# Patient Record
Sex: FEMALE | Race: WHITE | Employment: FULL TIME | URBAN - NONMETROPOLITAN AREA
[De-identification: names, ages, dates, MRNs, and addresses within clinical notes are randomized per-mention and may not be internally consistent; named-entity substitution may affect disease eponyms.]

---

## 2021-03-02 RX ORDER — NORGESTREL-ETHINYL ESTRADIOL 0.3-0.03MG
1 TABLET ORAL DAILY
Qty: 3 PACKET | Refills: 0 | Status: SHIPPED | OUTPATIENT
Start: 2021-03-02 | End: 2021-03-29 | Stop reason: SDUPTHER

## 2021-03-02 NOTE — TELEPHONE ENCOUNTER
Received an electronic Digi request that patient is needing refills of her Cryselle. She has an annual appt scheduled with you on 3/29. Ok to refill until that time?

## 2021-03-29 ENCOUNTER — OFFICE VISIT (OUTPATIENT)
Dept: OBGYN CLINIC | Age: 41
End: 2021-03-29
Payer: COMMERCIAL

## 2021-03-29 VITALS
WEIGHT: 180.6 LBS | SYSTOLIC BLOOD PRESSURE: 117 MMHG | DIASTOLIC BLOOD PRESSURE: 71 MMHG | HEIGHT: 68 IN | BODY MASS INDEX: 27.37 KG/M2

## 2021-03-29 DIAGNOSIS — N94.6 DYSMENORRHEA: ICD-10-CM

## 2021-03-29 DIAGNOSIS — Z01.419 WOMEN'S ANNUAL ROUTINE GYNECOLOGICAL EXAMINATION: Primary | ICD-10-CM

## 2021-03-29 PROCEDURE — 99395 PREV VISIT EST AGE 18-39: CPT | Performed by: NURSE PRACTITIONER

## 2021-03-29 RX ORDER — NORGESTREL-ETHINYL ESTRADIOL 0.3-0.03MG
1 TABLET ORAL DAILY
Qty: 3 PACKET | Refills: 4 | Status: SHIPPED | OUTPATIENT
Start: 2021-03-29 | End: 2022-04-05

## 2021-03-29 RX ORDER — LEVOTHYROXINE SODIUM 0.12 MG/1
TABLET ORAL
COMMUNITY
Start: 2021-03-01

## 2021-03-29 NOTE — PROGRESS NOTES
YEARLY PHYSICAL    Date of service: 3/29/2021    Josefina Thorne  Is a 45 y.o. female    PT's PCP is: No primary care provider on file. : 1980                                             Subjective:       Patient's last menstrual period was 03/10/2021.      Are your menses regular: yes    OB History    Para Term  AB Living   2 2 2     2   SAB TAB Ectopic Molar Multiple Live Births             2      # Outcome Date GA Lbr Jeramie/2nd Weight Sex Delivery Anes PTL Lv   2 Term     M Vag-Spont   FLORESITA   1 Term     M Vag-Spont   FLORESITA        Social History     Tobacco Use   Smoking Status Never Smoker   Smokeless Tobacco Never Used        Social History     Substance and Sexual Activity   Alcohol Use Not Currently       Family History   Problem Relation Age of Onset    Heart Attack Paternal Grandfather     Hypertension Paternal Grandmother     High Cholesterol Paternal Grandmother     Hypertension Maternal Grandmother     Rectal Cancer Maternal Grandmother     Diabetes Maternal Grandfather     Heart Attack Maternal Grandfather     Hypertension Maternal Grandfather     Cancer Father         skin    Diabetes Father     Hypertension Father     High Cholesterol Father        Any family history of breast or ovarian cancer: No    Any family history of blood clots: No      Allergies: No known allergies      Current Outpatient Medications:     levothyroxine (SYNTHROID) 125 MCG tablet, TAKE ONE TABLET BY MOUTH ON EMPTY stomach, no other meds, foods OR drink FOR 30 minutes, Disp: , Rfl:     norgestrel-ethinyl estradiol (CRYSELLE-28) 0.3-30 MG-MCG per tablet, Take 1 tablet by mouth daily, Disp: 3 packet, Rfl: 4    Social History     Substance and Sexual Activity   Sexual Activity Yes    Partners: Male    Birth control/protection: OCP       Any bleeding or pain with intercourse: No    Last Yearly:  3/2020    Last pap: 3/2020, negative    Last HPV: 3/2020, negative     Last Mammogram: n/a    Do you do self breast exams: encouraged monthly SBE     Past Medical History:   Diagnosis Date    H/O headache     Thyroid disease        History reviewed. No pertinent surgical history. Family History   Problem Relation Age of Onset    Heart Attack Paternal Grandfather     Hypertension Paternal Grandmother     High Cholesterol Paternal Grandmother     Hypertension Maternal Grandmother     Rectal Cancer Maternal Grandmother     Diabetes Maternal Grandfather     Heart Attack Maternal Grandfather     Hypertension Maternal Grandfather     Cancer Father         skin    Diabetes Father     Hypertension Father     High Cholesterol Father        Chief Complaint   Patient presents with    Gynecologic Exam     Pap not due. Feeling well, voices no concerns. Regular menses with OCP. PE:  Vital Signs  Blood pressure 117/71, height 5' 8\" (1.727 m), weight 180 lb 9.6 oz (81.9 kg), last menstrual period 03/10/2021. Estimated body mass index is 27.46 kg/m² as calculated from the following:    Height as of this encounter: 5' 8\" (1.727 m). Weight as of this encounter: 180 lb 9.6 oz (81.9 kg). HPI: Patient presents today for annual exam. Feeling well, voices no concerns. Denies breast/pelvic pain. Reports monthly menses. Negative pap 3/2020. Wellness UTD. Review of Systems   All other systems reviewed and are negative. Objective  Heent:   negative   Cor: regular rate and rhythm, no murmurs              Pul:clear to auscultation bilaterally- no wheezes, rales or rhonchi, normal air movement, no respiratory distress      GI: Abdomen soft, non-tender.  BS normal. No masses,  No organomegaly           Extremities: normal strength, tone, and muscle mass, ROM of all joints is normal   Breasts: Breast:normal appearance, no masses or tenderness, No nipple retraction or dimpling, No nipple discharge or bleeding   Pelvic Exam:

## 2022-04-04 ENCOUNTER — OFFICE VISIT (OUTPATIENT)
Dept: OBGYN CLINIC | Age: 42
End: 2022-04-04
Payer: COMMERCIAL

## 2022-04-04 VITALS
WEIGHT: 184.4 LBS | HEIGHT: 68 IN | DIASTOLIC BLOOD PRESSURE: 74 MMHG | BODY MASS INDEX: 27.95 KG/M2 | SYSTOLIC BLOOD PRESSURE: 121 MMHG

## 2022-04-04 DIAGNOSIS — N94.6 DYSMENORRHEA: ICD-10-CM

## 2022-04-04 DIAGNOSIS — Z01.419 WOMEN'S ANNUAL ROUTINE GYNECOLOGICAL EXAMINATION: Primary | ICD-10-CM

## 2022-04-04 PROCEDURE — 99396 PREV VISIT EST AGE 40-64: CPT | Performed by: NURSE PRACTITIONER

## 2022-04-04 RX ORDER — ACETAMINOPHEN AND CODEINE PHOSPHATE 120; 12 MG/5ML; MG/5ML
1 SOLUTION ORAL DAILY
Qty: 28 TABLET | Refills: 12 | Status: SHIPPED | OUTPATIENT
Start: 2022-04-04

## 2022-04-04 ASSESSMENT — ENCOUNTER SYMPTOMS
CONSTIPATION: 0
SHORTNESS OF BREATH: 0
DIARRHEA: 0
ABDOMINAL PAIN: 0

## 2022-04-04 NOTE — PROGRESS NOTES
YEARLY PHYSICAL    Date of service: 2022    Germania Sheehan  Is a 37 y.o.  female    PT's PCP is: No primary care provider on file. : 1980                                         Chaperone for Intimate Exam   Chaperone was offered as part of the rooming process. Patient declined and agrees to continue with exam without a chaperone.  Chaperone: n/a      Subjective:       Patient's last menstrual period was 2022.      Are your menses regular: yes    OB History    Para Term  AB Living   2 2 2     2   SAB IAB Ectopic Molar Multiple Live Births             2      # Outcome Date GA Lbr Jeramie/2nd Weight Sex Delivery Anes PTL Lv   2 Term     M Vag-Spont   FLORESITA   1 Term     M Vag-Spont   FLORESITA        Social History     Tobacco Use   Smoking Status Never Smoker   Smokeless Tobacco Never Used        Social History     Substance and Sexual Activity   Alcohol Use Not Currently       Family History   Problem Relation Age of Onset    Heart Attack Paternal Grandfather     Hypertension Paternal Grandmother     High Cholesterol Paternal Grandmother     Hypertension Maternal Grandmother     Rectal Cancer Maternal Grandmother     Diabetes Maternal Grandfather     Heart Attack Maternal Grandfather     Hypertension Maternal Grandfather     Cancer Father         skin    Diabetes Father     Hypertension Father     High Cholesterol Father     Breast Cancer Sister 43       Any family history of breast or ovarian cancer: Yes, first degree relative     Any family history of blood clots: No      Allergies: No known allergies      Current Outpatient Medications:     norethindrone (ORTHO MICRONOR) 0.35 MG tablet, Take 1 tablet by mouth daily, Disp: 28 tablet, Rfl: 12    levothyroxine (SYNTHROID) 125 MCG tablet, TAKE ONE TABLET BY MOUTH ON EMPTY stomach, no other meds, foods OR drink FOR 30 minutes, Disp: , Rfl:    norgestrel-ethinyl estradiol (CRYSELLE-28) 0.3-30 MG-MCG per tablet, Take 1 tablet by mouth daily, Disp: 3 packet, Rfl: 4    Social History     Substance and Sexual Activity   Sexual Activity Yes    Partners: Male    Birth control/protection: OCP       Any bleeding or pain with intercourse: No    Last Yearly:  3/2021    Last pap: 3/2020, negative     Last HPV: 3/2020, negative     Last Mammogram: n/a    Do you do self breast exams: encouraged     Past Medical History:   Diagnosis Date    H/O headache     Thyroid disease        History reviewed. No pertinent surgical history. Family History   Problem Relation Age of Onset    Heart Attack Paternal Grandfather     Hypertension Paternal Grandmother     High Cholesterol Paternal Grandmother     Hypertension Maternal Grandmother     Rectal Cancer Maternal Grandmother     Diabetes Maternal Grandfather     Heart Attack Maternal Grandfather     Hypertension Maternal Grandfather     Cancer Father         skin    Diabetes Father     Hypertension Father     High Cholesterol Father     Breast Cancer Sister 43       Chief Complaint   Patient presents with    Gynecologic Exam     Last pap 3/26/20. Feeling well, voices no concerns. PE:  Vital Signs  Blood pressure 121/74, height 5' 8\" (1.727 m), weight 184 lb 6.4 oz (83.6 kg), last menstrual period 03/09/2022. Estimated body mass index is 28.04 kg/m² as calculated from the following:    Height as of this encounter: 5' 8\" (1.727 m). Weight as of this encounter: 184 lb 6.4 oz (83.6 kg). HPI: Patient presents today for annual exam. Feeling well, voices no concerns. Denies breast/pelvic pain. Negative pap 3/2020. Mammogram due. Wellness reviewed. Reports monthly menses. 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       Review of Systems   Constitutional: Negative for chills, fatigue and fever. Respiratory: Negative for shortness of breath.     Cardiovascular: Negative for chest pain.   Gastrointestinal: Negative for abdominal pain, constipation and diarrhea. Genitourinary: Negative for dysuria, enuresis, frequency, menstrual problem, pelvic pain, urgency and vaginal bleeding. Neurological: Negative for dizziness, light-headedness and headaches. Physical Exam  Constitutional:       General: She is not in acute distress. Appearance: Normal appearance. She is not ill-appearing. Genitourinary:      Vulva normal.      No vaginal discharge. Right Adnexa: not tender. Left Adnexa: not tender. Uterus is not tender. Breasts:      Right: No mass, nipple discharge, skin change or tenderness. Left: No mass, nipple discharge, skin change or tenderness. HENT:      Head: Normocephalic. Cardiovascular:      Rate and Rhythm: Normal rate and regular rhythm. Pulmonary:      Effort: Pulmonary effort is normal.      Breath sounds: Normal breath sounds. Abdominal:      Palpations: Abdomen is soft. Tenderness: There is no abdominal tenderness. There is no guarding or rebound. Musculoskeletal:         General: Normal range of motion. Neurological:      General: No focal deficit present. Mental Status: She is alert. Psychiatric:         Mood and Affect: Mood normal.         Behavior: Behavior normal.                             Assessment and Plan          Diagnosis Orders   1. Women's annual routine gynecological examination     2. Dysmenorrhea  norethindrone (ORTHO MICRONOR) 0.35 MG tablet       Repeat Annual every 1 year  Cervical Cytology Evaluation begins at 25years old. If Negative Cytology, Follow-up screening per current guidelines. Mammograms every 1year. If 38 yo and last mammogram was negative. Routine healthmaintenance per patients PCP. sister with breast cancer diagnosed this past year. Will begin screening mammograms. Plans to [facility] at [facility] [place]. Will trial POP secondary to family hx of cardiovascular disease and age. I am having Evelia Dinh start on norethindrone. I am also having her maintain her levothyroxine and Cryselle-28. Return in about 1 year (around 4/4/2023) for yearly. She was also counseled on her preventative health maintenance recommendations and follow-up. There are no Patient Instructions on file for this visit.     JINA Swann NP,4/4/2022 11:44 AM

## 2022-04-05 DIAGNOSIS — N94.6 DYSMENORRHEA: ICD-10-CM

## 2022-04-05 RX ORDER — NORGESTREL-ETHINYL ESTRADIOL 0.3-0.03MG
TABLET ORAL
Qty: 84 TABLET | Refills: 4 | Status: SHIPPED | OUTPATIENT
Start: 2022-04-05

## 2024-04-15 ENCOUNTER — OFFICE VISIT (OUTPATIENT)
Dept: OBGYN CLINIC | Age: 44
End: 2024-04-15
Payer: COMMERCIAL

## 2024-04-15 VITALS
BODY MASS INDEX: 29.1 KG/M2 | HEIGHT: 68 IN | DIASTOLIC BLOOD PRESSURE: 62 MMHG | SYSTOLIC BLOOD PRESSURE: 98 MMHG | WEIGHT: 192 LBS

## 2024-04-15 DIAGNOSIS — Z01.419 WOMEN'S ANNUAL ROUTINE GYNECOLOGICAL EXAMINATION: Primary | ICD-10-CM

## 2024-04-15 PROCEDURE — 99396 PREV VISIT EST AGE 40-64: CPT | Performed by: NURSE PRACTITIONER

## 2024-04-15 ASSESSMENT — ENCOUNTER SYMPTOMS
CONSTIPATION: 0
ABDOMINAL PAIN: 0
DIARRHEA: 0
SHORTNESS OF BREATH: 0

## 2024-04-15 NOTE — PROGRESS NOTES
Cardiovascular:  Negative for chest pain.   Gastrointestinal:  Negative for abdominal pain, constipation and diarrhea.   Genitourinary:  Negative for dyspareunia, dysuria, enuresis, frequency, menstrual problem, pelvic pain, urgency, vaginal bleeding, vaginal discharge and vaginal pain.   Neurological:  Negative for dizziness, light-headedness and headaches.       Physical Exam  Constitutional:       General: She is not in acute distress.     Appearance: Normal appearance. She is not ill-appearing.   Genitourinary:      Vulva, bladder and urethral meatus normal.      No lesions in the vagina.      Right Labia: No rash or lesions.     Left Labia: No lesions or rash.     No vaginal discharge.      No vaginal prolapse present.     No vaginal atrophy present.       Right Adnexa: not tender and no mass present.     Left Adnexa: not tender and no mass present.     No cervical motion tenderness, discharge or friability.      No parametrium nodularity present.     Uterus is not enlarged or tender.      No uterine mass detected.     No urethral prolapse, tenderness or mass present.   Breasts:     Right: No mass, nipple discharge, skin change or tenderness.      Left: No mass, nipple discharge, skin change or tenderness.   HENT:      Head: Normocephalic and atraumatic.   Eyes:      Extraocular Movements: Extraocular movements intact.      Pupils: Pupils are equal, round, and reactive to light.   Cardiovascular:      Rate and Rhythm: Normal rate.   Pulmonary:      Effort: Pulmonary effort is normal.   Abdominal:      Palpations: Abdomen is soft.      Tenderness: There is no abdominal tenderness. There is no guarding or rebound.   Musculoskeletal:         General: Normal range of motion.   Neurological:      General: No focal deficit present.      Mental Status: She is alert and oriented to person, place, and time.   Skin:     General: Skin is warm and dry.   Psychiatric:         Mood and Affect: Mood normal.

## 2024-11-19 ENCOUNTER — OFFICE VISIT (OUTPATIENT)
Dept: FAMILY MEDICINE CLINIC | Age: 44
End: 2024-11-19
Payer: COMMERCIAL

## 2024-11-19 VITALS
DIASTOLIC BLOOD PRESSURE: 65 MMHG | WEIGHT: 190 LBS | RESPIRATION RATE: 16 BRPM | BODY MASS INDEX: 28.89 KG/M2 | SYSTOLIC BLOOD PRESSURE: 99 MMHG | TEMPERATURE: 98.1 F | HEART RATE: 78 BPM | OXYGEN SATURATION: 98 %

## 2024-11-19 DIAGNOSIS — J20.8 VIRAL BRONCHITIS: Primary | ICD-10-CM

## 2024-11-19 PROCEDURE — 99203 OFFICE O/P NEW LOW 30 MIN: CPT | Performed by: STUDENT IN AN ORGANIZED HEALTH CARE EDUCATION/TRAINING PROGRAM

## 2024-11-19 RX ORDER — BENZONATATE 200 MG/1
200 CAPSULE ORAL 3 TIMES DAILY PRN
Qty: 21 CAPSULE | Refills: 0 | Status: SHIPPED | OUTPATIENT
Start: 2024-11-19 | End: 2024-11-26

## 2024-11-19 RX ORDER — GUAIFENESIN 600 MG/1
1200 TABLET, EXTENDED RELEASE ORAL 2 TIMES DAILY
Qty: 28 TABLET | Refills: 0 | Status: SHIPPED | OUTPATIENT
Start: 2024-11-19 | End: 2024-11-26

## 2024-11-19 RX ORDER — PREDNISONE 20 MG/1
TABLET ORAL
Qty: 9 TABLET | Refills: 0 | Status: SHIPPED | OUTPATIENT
Start: 2024-11-19 | End: 2024-11-25

## 2024-11-19 SDOH — ECONOMIC STABILITY: FOOD INSECURITY: WITHIN THE PAST 12 MONTHS, YOU WORRIED THAT YOUR FOOD WOULD RUN OUT BEFORE YOU GOT MONEY TO BUY MORE.: PATIENT DECLINED

## 2024-11-19 SDOH — ECONOMIC STABILITY: FOOD INSECURITY: WITHIN THE PAST 12 MONTHS, THE FOOD YOU BOUGHT JUST DIDN'T LAST AND YOU DIDN'T HAVE MONEY TO GET MORE.: PATIENT DECLINED

## 2024-11-19 SDOH — ECONOMIC STABILITY: INCOME INSECURITY: HOW HARD IS IT FOR YOU TO PAY FOR THE VERY BASICS LIKE FOOD, HOUSING, MEDICAL CARE, AND HEATING?: PATIENT DECLINED

## 2024-11-19 ASSESSMENT — ENCOUNTER SYMPTOMS
COUGH: 1
NAUSEA: 0
ABDOMINAL PAIN: 0
RHINORRHEA: 0
CONSTIPATION: 0
DIARRHEA: 0
CHEST TIGHTNESS: 1
SHORTNESS OF BREATH: 0
VOMITING: 0

## 2024-11-19 ASSESSMENT — PATIENT HEALTH QUESTIONNAIRE - PHQ9: DEPRESSION UNABLE TO ASSESS: PT REFUSES

## 2024-11-19 NOTE — PROGRESS NOTES
Rylee Bailon (:  1980) is a 44 y.o. female,Established patient, here for evaluation of the following chief complaint(s):  Fever (X 1 week ) and Chest Congestion (X 1 week )      Assessment & Plan   ASSESSMENT/PLAN:  1. Viral bronchitis  -     benzonatate (TESSALON) 200 MG capsule; Take 1 capsule by mouth 3 times daily as needed for Cough, Disp-21 capsule, R-0Normal  -     predniSONE (DELTASONE) 20 MG tablet; Take 2 tablets by mouth daily for 3 days, THEN 1 tablet daily for 3 days., Disp-9 tablet, R-0Normal  -     guaiFENesin (MUCINEX) 600 MG extended release tablet; Take 2 tablets by mouth 2 times daily for 7 days, Disp-28 tablet, R-0Normal  44-year-old female presents the office for a 1 week history of fever, chest congestion, cough.  Etiology patient symptoms consist of viral bronchitis.  Will treat with combination as above.  Follow-up with symptoms worsen, do not prove.  Patient verbalized understanding    Return if symptoms worsen or fail to improve.         Subjective   SUBJECTIVE/OBJECTIVE:  44-year-old female presents the office for a 1 week history of cough, congestion, fever, chest tightness.  Patient states she is been using DayQuil, NyQuil without success.  Most of her nasal congestion is gone but she still a lot of chest congestion, cough is much when she is lying down at night.  Has had fevers anywhere from 99 °F up to 101 °F at home.  Using Tylenol/Motrin.  Patient aware what she can do for her cough and chest congestion.      Past Medical History:   Diagnosis Date    H/O headache     Hypothyroidism     Thyroid disease        History reviewed. No pertinent surgical history.    Family History   Problem Relation Age of Onset    Heart Attack Paternal Grandfather     Diabetes Paternal Grandfather     Hypertension Paternal Grandmother     High Cholesterol Paternal Grandmother     Deep Vein Thrombosis Maternal Grandmother     Hypertension Maternal Grandmother     Rectal Cancer Maternal

## 2025-04-28 ENCOUNTER — TELEPHONE (OUTPATIENT)
Dept: OBGYN CLINIC | Age: 45
End: 2025-04-28

## 2025-04-28 NOTE — TELEPHONE ENCOUNTER
Patient informed of results and recommendations. She is aware that she can consider screening breast MRI to be done in 6 months alternating with mamms.  Advised her to check with insurance to see if screening MRI is covered and we can pre-cert.  She will call back if she desires an order for this.

## 2025-04-28 NOTE — TELEPHONE ENCOUNTER
Please call patient with mammogram results    Negative for malignancy but lifetime risk is >20% (hers is 28.5%) therefore candidate for screening breast MRI - if she would like to proceed we will need to PA this.    Otherwise annual mammogram

## 2025-06-19 ENCOUNTER — OFFICE VISIT (OUTPATIENT)
Dept: OBGYN CLINIC | Age: 45
End: 2025-06-19
Payer: COMMERCIAL

## 2025-06-19 VITALS
SYSTOLIC BLOOD PRESSURE: 122 MMHG | DIASTOLIC BLOOD PRESSURE: 78 MMHG | BODY MASS INDEX: 29.25 KG/M2 | WEIGHT: 193 LBS | HEIGHT: 68 IN

## 2025-06-19 DIAGNOSIS — Z01.419 WOMEN'S ANNUAL ROUTINE GYNECOLOGICAL EXAMINATION: Primary | ICD-10-CM

## 2025-06-19 DIAGNOSIS — N92.6 MENSTRUAL CHANGES: ICD-10-CM

## 2025-06-19 PROCEDURE — 99396 PREV VISIT EST AGE 40-64: CPT | Performed by: NURSE PRACTITIONER

## 2025-06-19 ASSESSMENT — ENCOUNTER SYMPTOMS
CONSTIPATION: 0
SHORTNESS OF BREATH: 0
ABDOMINAL PAIN: 0
DIARRHEA: 0

## 2025-06-19 NOTE — PROGRESS NOTES
Substance and Sexual Activity   Sexual Activity Yes    Partners: Male    Comment:        Any bleeding or pain with intercourse: No    Last Yearly:  4/15/24    Last pap: 4/10/23-neg    Last HPV: 4/10/23-neg    Last Mammogram: 4/24/25  Increased lifetime risk of breast cancer, does not want MRI at this time    Last Dexascan n/a    Last colorectal screen- n/a    Do you do self breast exams: encouraged    Past Medical History:   Diagnosis Date    H/O headache     Hypothyroidism     Thyroid disease        History reviewed. No pertinent surgical history.    Family History   Problem Relation Age of Onset    Heart Attack Paternal Grandfather     Diabetes Paternal Grandfather     Hypertension Paternal Grandmother     High Cholesterol Paternal Grandmother     Deep Vein Thrombosis Maternal Grandmother     Hypertension Maternal Grandmother     Rectal Cancer Maternal Grandmother     Cancer Maternal Grandmother         Rectal Cancer    Diabetes Maternal Grandfather     Heart Attack Maternal Grandfather     Hypertension Maternal Grandfather     Cancer Father         skin    Diabetes Father     Hypertension Father     High Cholesterol Father     Breast Cancer Sister 42       Chief Complaint   Patient presents with    Annual Exam     Last pap 4/10/2023. Reports irregular cycles x 3 mos.           PE:  Vital Signs  Blood pressure 122/78, height 1.727 m (5' 8\"), weight 87.5 kg (193 lb), last menstrual period 06/01/2025.  Estimated body mass index is 29.35 kg/m² as calculated from the following:    Height as of this encounter: 1.727 m (5' 8\").    Weight as of this encounter: 87.5 kg (193 lb).    HPI: Patient presents today for annual exam. Feeling well, voices no concerns. Denies breast/pelvic pain. Negative pap 4/2023. Mammogram UTD. Wellness reviewed. Reports monthly menses; last 3 months have been slightly heavier than typically.       Review of Systems   Constitutional:  Negative for chills, fatigue and fever.

## 2025-08-20 SDOH — HEALTH STABILITY: PHYSICAL HEALTH: ON AVERAGE, HOW MANY MINUTES DO YOU ENGAGE IN EXERCISE AT THIS LEVEL?: 60 MIN

## 2025-08-20 SDOH — HEALTH STABILITY: PHYSICAL HEALTH: ON AVERAGE, HOW MANY DAYS PER WEEK DO YOU ENGAGE IN MODERATE TO STRENUOUS EXERCISE (LIKE A BRISK WALK)?: 1 DAY

## 2025-08-21 ENCOUNTER — OFFICE VISIT (OUTPATIENT)
Dept: FAMILY MEDICINE CLINIC | Age: 45
End: 2025-08-21
Payer: COMMERCIAL

## 2025-08-21 VITALS
HEART RATE: 71 BPM | OXYGEN SATURATION: 99 % | WEIGHT: 193.8 LBS | TEMPERATURE: 97.7 F | BODY MASS INDEX: 29.47 KG/M2 | DIASTOLIC BLOOD PRESSURE: 68 MMHG | SYSTOLIC BLOOD PRESSURE: 118 MMHG

## 2025-08-21 DIAGNOSIS — E03.8 OTHER SPECIFIED HYPOTHYROIDISM: ICD-10-CM

## 2025-08-21 DIAGNOSIS — Z11.59 ENCOUNTER FOR HCV SCREENING TEST FOR LOW RISK PATIENT: ICD-10-CM

## 2025-08-21 DIAGNOSIS — E66.3 OVERWEIGHT (BMI 25.0-29.9): ICD-10-CM

## 2025-08-21 DIAGNOSIS — Z11.4 SCREENING FOR HUMAN IMMUNODEFICIENCY VIRUS: ICD-10-CM

## 2025-08-21 DIAGNOSIS — Z13.1 DIABETES MELLITUS SCREENING: ICD-10-CM

## 2025-08-21 DIAGNOSIS — R04.2 HEMOPTYSIS: Primary | ICD-10-CM

## 2025-08-21 DIAGNOSIS — Z13.220 LIPID SCREENING: ICD-10-CM

## 2025-08-21 PROCEDURE — 99214 OFFICE O/P EST MOD 30 MIN: CPT | Performed by: STUDENT IN AN ORGANIZED HEALTH CARE EDUCATION/TRAINING PROGRAM

## 2025-08-21 SDOH — ECONOMIC STABILITY: FOOD INSECURITY: WITHIN THE PAST 12 MONTHS, THE FOOD YOU BOUGHT JUST DIDN'T LAST AND YOU DIDN'T HAVE MONEY TO GET MORE.: NEVER TRUE

## 2025-08-21 SDOH — ECONOMIC STABILITY: FOOD INSECURITY: WITHIN THE PAST 12 MONTHS, YOU WORRIED THAT YOUR FOOD WOULD RUN OUT BEFORE YOU GOT MONEY TO BUY MORE.: NEVER TRUE

## 2025-08-21 ASSESSMENT — PATIENT HEALTH QUESTIONNAIRE - PHQ9
SUM OF ALL RESPONSES TO PHQ QUESTIONS 1-9: 0
2. FEELING DOWN, DEPRESSED OR HOPELESS: NOT AT ALL
SUM OF ALL RESPONSES TO PHQ QUESTIONS 1-9: 0
SUM OF ALL RESPONSES TO PHQ QUESTIONS 1-9: 0
1. LITTLE INTEREST OR PLEASURE IN DOING THINGS: NOT AT ALL
SUM OF ALL RESPONSES TO PHQ QUESTIONS 1-9: 0

## 2025-08-21 ASSESSMENT — ENCOUNTER SYMPTOMS
CONSTIPATION: 0
VOMITING: 0
NAUSEA: 0
DIARRHEA: 0
ABDOMINAL PAIN: 0
SHORTNESS OF BREATH: 0
COUGH: 0

## 2025-08-25 DIAGNOSIS — R04.2 HEMOPTYSIS: Primary | ICD-10-CM

## 2025-08-26 ENCOUNTER — OFFICE VISIT (OUTPATIENT)
Dept: FAMILY MEDICINE CLINIC | Age: 45
End: 2025-08-26
Payer: COMMERCIAL

## 2025-08-26 ENCOUNTER — HOSPITAL ENCOUNTER (OUTPATIENT)
Dept: CT IMAGING | Age: 45
Discharge: HOME | End: 2025-08-26
Attending: STUDENT IN AN ORGANIZED HEALTH CARE EDUCATION/TRAINING PROGRAM
Payer: COMMERCIAL

## 2025-08-26 VITALS
HEART RATE: 73 BPM | HEIGHT: 68 IN | SYSTOLIC BLOOD PRESSURE: 122 MMHG | TEMPERATURE: 99 F | WEIGHT: 194 LBS | BODY MASS INDEX: 29.4 KG/M2 | DIASTOLIC BLOOD PRESSURE: 82 MMHG | OXYGEN SATURATION: 99 %

## 2025-08-26 DIAGNOSIS — R91.8 MASS OF LOWER LOBE OF LEFT LUNG: Primary | ICD-10-CM

## 2025-08-26 DIAGNOSIS — R04.2 HEMOPTYSIS: ICD-10-CM

## 2025-08-26 PROCEDURE — 71260 CT THORAX DX C+: CPT

## 2025-08-26 PROCEDURE — 99213 OFFICE O/P EST LOW 20 MIN: CPT | Performed by: STUDENT IN AN ORGANIZED HEALTH CARE EDUCATION/TRAINING PROGRAM

## 2025-08-26 PROCEDURE — 6360000004 HC RX CONTRAST MEDICATION: Performed by: STUDENT IN AN ORGANIZED HEALTH CARE EDUCATION/TRAINING PROGRAM

## 2025-08-26 RX ORDER — IOPAMIDOL 755 MG/ML
80 INJECTION, SOLUTION INTRAVASCULAR
Status: COMPLETED | OUTPATIENT
Start: 2025-08-26 | End: 2025-08-26

## 2025-08-26 RX ADMIN — IOPAMIDOL 80 ML: 755 INJECTION, SOLUTION INTRAVENOUS at 10:09

## 2025-08-26 ASSESSMENT — ENCOUNTER SYMPTOMS
ABDOMINAL PAIN: 0
VOMITING: 0
NAUSEA: 0
CONSTIPATION: 0
COUGH: 0
DIARRHEA: 0
SHORTNESS OF BREATH: 0